# Patient Record
Sex: MALE | Race: BLACK OR AFRICAN AMERICAN | Employment: FULL TIME | URBAN - METROPOLITAN AREA
[De-identification: names, ages, dates, MRNs, and addresses within clinical notes are randomized per-mention and may not be internally consistent; named-entity substitution may affect disease eponyms.]

---

## 2019-05-20 ENCOUNTER — OFFICE VISIT (OUTPATIENT)
Dept: FAMILY MEDICINE CLINIC | Age: 44
End: 2019-05-20

## 2019-05-20 ENCOUNTER — HOSPITAL ENCOUNTER (OUTPATIENT)
Dept: GENERAL RADIOLOGY | Age: 44
Discharge: HOME | End: 2019-05-20
Payer: OTHER GOVERNMENT

## 2019-05-20 VITALS
DIASTOLIC BLOOD PRESSURE: 76 MMHG | WEIGHT: 229 LBS | RESPIRATION RATE: 16 BRPM | BODY MASS INDEX: 32.06 KG/M2 | HEIGHT: 71 IN | SYSTOLIC BLOOD PRESSURE: 112 MMHG | HEART RATE: 65 BPM | TEMPERATURE: 98.1 F | OXYGEN SATURATION: 96 %

## 2019-05-20 DIAGNOSIS — M53.3 TAIL BONE PAIN: ICD-10-CM

## 2019-05-20 DIAGNOSIS — Z00.00 ROUTINE MEDICAL EXAM: Primary | ICD-10-CM

## 2019-05-20 PROCEDURE — 72220 X-RAY EXAM SACRUM TAILBONE: CPT

## 2019-05-20 NOTE — PROGRESS NOTES
Subjective: Yadi Willams is a 40 y.o. male presenting for his annual checkup. No specific concern  Sexually active with one partner. Not using any protection. No h/o STD. discussed high BMI. Discussed diet modification, calorie count and exercise. Discussed importance of weight loss. agree to do exercise and life style modification. Diet and exercise hand out given in AVS.   Has tail bone pain on and off. Since last 3 years. Leana Snipe it occurs with prolong sitting. 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 Fell 6 months ago and that was hurting his lower back. No tenderness during today's exam.  No lower ext tingling or numbness or weakness. No trouble ambulation. No radiation of pain. No other concern. ROS:  Feeling well. No dyspnea or chest pain on exertion. No abdominal pain, change in bowel habits, black or bloody stools. No urinary tract or prostatic symptoms. No neurological complaints. There are no active problems to display for this patient. No Known Allergies  No past medical history on file. No past surgical history on file. No family history on file. Social History     Tobacco Use    Smoking status: Never Smoker    Smokeless tobacco: Never Used   Substance Use Topics    Alcohol use: Yes     Frequency: Monthly or less     Drinks per session: 1 or 2     Binge frequency: Never     Comment: rare/social          Objective:     Visit Vitals  /76 (BP 1 Location: Left arm, BP Patient Position: Sitting)   Pulse 65   Temp 98.1 °F (36.7 °C) (Oral)   Resp 16   Ht 5' 11\" (1.803 m)   Wt 229 lb (103.9 kg)   SpO2 96%   BMI 31.94 kg/m²     The patient appears well, alert, oriented x 3, in no distress. ENT normal.  Neck supple. No adenopathy or thyromegaly. WILLIAM. Lungs are clear, good air entry, no wheezes, rhonchi or rales. S1 and S2 normal, no murmurs, regular rate and rhythm. Abdomen is soft without tenderness, guarding, mass or organomegaly.   exam: no penile lesions or discharge, no testicular masses or tenderness, no hernias. Extremities show no edema, normal peripheral pulses. Neurological is normal without focal findings. No point tenderness over tail bone or point tenderness over lumbosacral spine   ROM wnl. No paraspinal muscle tenderness     Assessment/Plan:       ICD-10-CM ICD-9-CM    1. Routine medical exam Z00.00 V70.0    2. Tail bone pain: no point tenderness. On and off. Obtain x-ray as it is going on since last few years. Sending for PT as well. Heating pad. Motrin or tylenol as needed with food for pain. M53.3 724.79 REFERRAL TO PHYSICAL THERAPY      XR SACRUM AND COCCYX   Pt understood and agree with the plan   Review hM   Follow-up and Dispositions    · Return in about 2 months (around 7/20/2019).      Bam Ware

## 2019-05-20 NOTE — PATIENT INSTRUCTIONS
Well Visit, Ages 25 to 48: Care Instructions  Your Care Instructions    Physical exams can help you stay healthy. Your doctor has checked your overall health and may have suggested ways to take good care of yourself. He or she also may have recommended tests. At home, you can help prevent illness with healthy eating, regular exercise, and other steps. Follow-up care is a key part of your treatment and safety. Be sure to make and go to all appointments, and call your doctor if you are having problems. It's also a good idea to know your test results and keep a list of the medicines you take. How can you care for yourself at home? · Reach and stay at a healthy weight. This will lower your risk for many problems, such as obesity, diabetes, heart disease, and high blood pressure. · Get at least 30 minutes of physical activity on most days of the week. Walking is a good choice. You also may want to do other activities, such as running, swimming, cycling, or playing tennis or team sports. Discuss any changes in your exercise program with your doctor. · Do not smoke or allow others to smoke around you. If you need help quitting, talk to your doctor about stop-smoking programs and medicines. These can increase your chances of quitting for good. · Talk to your doctor about whether you have any risk factors for sexually transmitted infections (STIs). Having one sex partner (who does not have STIs and does not have sex with anyone else) is a good way to avoid these infections. · Use birth control if you do not want to have children at this time. Talk with your doctor about the choices available and what might be best for you. · Protect your skin from too much sun. When you're outdoors from 10 a.m. to 4 p.m., stay in the shade or cover up with clothing and a hat with a wide brim. Wear sunglasses that block UV rays. Even when it's cloudy, put broad-spectrum sunscreen (SPF 30 or higher) on any exposed skin.   · See a dentist one or two times a year for checkups and to have your teeth cleaned. · Wear a seat belt in the car. · Drink alcohol in moderation, if at all. That means no more than 2 drinks a day for men and 1 drink a day for women. Follow your doctor's advice about when to have certain tests. These tests can spot problems early. For everyone  · Cholesterol. Have the fat (cholesterol) in your blood tested after age 21. Your doctor will tell you how often to have this done based on your age, family history, or other things that can increase your risk for heart disease. · Blood pressure. Have your blood pressure checked during a routine doctor visit. Your doctor will tell you how often to check your blood pressure based on your age, your blood pressure results, and other factors. · Vision. Talk with your doctor about how often to have a glaucoma test.  · Diabetes. Ask your doctor whether you should have tests for diabetes. · Colon cancer. Have a test for colon cancer at age 48. You may have one of several tests. If you are younger than 48, you may need a test earlier if you have any risk factors. Risk factors include whether you already had a precancerous polyp removed from your colon or whether your parent, brother, sister, or child has had colon cancer. For women  · Breast exam and mammogram. Talk to your doctor about when you should have a clinical breast exam and a mammogram. Medical experts differ on whether and how often women under 50 should have these tests. Your doctor can help you decide what is right for you. · Pap test and pelvic exam. Begin Pap tests at age 24. A Pap test is the best way to find cervical cancer. The test often is part of a pelvic exam. Ask how often to have this test.  · Tests for sexually transmitted infections (STIs). Ask whether you should have tests for STIs. You may be at risk if you have sex with more than one person, especially if your partners do not wear condoms.   For men  · Tests for sexually transmitted infections (STIs). Ask whether you should have tests for STIs. You may be at risk if you have sex with more than one person, especially if you do not wear a condom. · Testicular cancer exam. Ask your doctor whether you should check your testicles regularly. · Prostate exam. Talk to your doctor about whether you should have a blood test (called a PSA test) for prostate cancer. Experts differ on whether and when men should have this test. Some experts suggest it if you are older than 39 and are -American or have a father or brother who got prostate cancer when he was younger than 72. When should you call for help? Watch closely for changes in your health, and be sure to contact your doctor if you have any problems or symptoms that concern you. Where can you learn more? Go to http://amparo-alissa.info/. Enter P072 in the search box to learn more about \"Well Visit, Ages 25 to 48: Care Instructions. \"  Current as of: March 28, 2018  Content Version: 11.9  © 5669-7202 [employer]. Care instructions adapted under license by [employer] (which disclaims liability or warranty for this information). If you have questions about a medical condition or this instruction, always ask your healthcare professional. [place] [address] any warranty or liability for your use of this information. Eating Healthy Foods: Care Instructions  Your Care Instructions    Eating healthy foods can help lower your risk for disease. Healthy food gives you energy and keeps your heart strong, your brain active, your muscles working, and your bones strong. A healthy diet includes a variety of foods from the basic food groups: grains, vegetables, fruits, milk and milk products, and meat and beans. Some people may eat more of their favorite foods from only one food group and, as a result, miss getting the nutrients they need.  So, it is important to pay attention not only to what you eat but also to what you are missing from your diet. You can eat a healthy, balanced diet by making a few small changes. Follow-up care is a key part of your treatment and safety. Be sure to make and go to all appointments, and call your doctor if you are having problems. It's also a good idea to know your test results and keep a list of the medicines you take. How can you care for yourself at home? Look at what you eat  · Keep a food diary for a week or two and record everything you eat or drink. Track the number of servings you eat from each food group. · For a balanced diet every day, eat a variety of:  ? 6 or more ounce-equivalents of grains, such as cereals, breads, crackers, rice, or pasta, every day. An ounce-equivalent is 1 slice of bread, 1 cup of ready-to-eat cereal, or ½ cup of cooked rice, cooked pasta, or cooked cereal.  ? 2½ cups of vegetables, especially:  § Dark-green vegetables such as broccoli and spinach. § Orange vegetables such as carrots and sweet potatoes. § Dry beans (such as cardona and kidney beans) and peas (such as lentils). ? 2 cups of fresh, frozen, or canned fruit. A small apple or 1 banana or orange equals 1 cup. ? 3 cups of nonfat or low-fat milk, yogurt, or other milk products. ? 5½ ounces of meat and beans, such as chicken, fish, lean meat, beans, nuts, and seeds. One egg, 1 tablespoon of peanut butter, ½ ounce nuts or seeds, or ¼ cup of cooked beans equals 1 ounce of meat. · Learn how to read food labels for serving sizes and ingredients. Fast-food and convenience-food meals often contain few or no fruits or vegetables. Make sure you eat some fruits and vegetables to make the meal more nutritious. · Look at your food diary. For each food group, add up what you have eaten and then divide the total by the number of days. This will give you an idea of how much you are eating from each food group.  See if you can find some ways to change your diet to make it more healthy. Start small  · Do not try to make dramatic changes to your diet all at once. You might feel that you are missing out on your favorite foods and then be more likely to fail. · Start slowly, and gradually change your habits. Try some of the following:  ? Use whole wheat bread instead of white bread. ? Use nonfat or low-fat milk instead of whole milk. ? Eat brown rice instead of white rice, and eat whole wheat pasta instead of white-flour pasta. ? Try low-fat cheeses and low-fat yogurt. ? Add more fruits and vegetables to meals and have them for snacks. ? Add lettuce, tomato, cucumber, and onion to sandwiches. ? Add fruit to yogurt and cereal.  Enjoy food  · You can still eat your favorite foods. You just may need to eat less of them. If your favorite foods are high in fat, salt, and sugar, limit how often you eat them, but do not cut them out entirely. · Eat a wide variety of foods. Make healthy choices when eating out  · The type of restaurant you choose can help you make healthy choices. Even fast-food chains are now offering more low-fat or healthier choices on the menu. · Choose smaller portions, or take half of your meal home. · When eating out, try:  ? A veggie pizza with a whole wheat crust or grilled chicken (instead of sausage or pepperoni). ? Pasta with roasted vegetables, grilled chicken, or marinara sauce instead of cream sauce. ? A vegetable wrap or grilled chicken wrap. ? Broiled or poached food instead of fried or breaded items. Make healthy choices easy  · Buy packaged, prewashed, ready-to-eat fresh vegetables and fruits, such as baby carrots, salad mixes, and chopped or shredded broccoli and cauliflower. · Buy packaged, presliced fruits, such as melon or pineapple. · Choose 100% fruit or vegetable juice instead of soda. Limit juice intake to 4 to 6 oz (½ to ¾ cup) a day.   · Blend low-fat yogurt, fruit juice, and canned or frozen fruit to make a smoothie for breakfast or a snack. Where can you learn more? Go to http://amparo-alissa.info/. Enter T756 in the search box to learn more about \"Eating Healthy Foods: Care Instructions. \"  Current as of: March 28, 2018  Content Version: 11.9  © 7053-6578 [employer]. Care instructions adapted under license by [employer] (which disclaims liability or warranty for this information). If you have questions about a medical condition or this instruction, always ask your healthcare professional. [address] any warranty or liability for your use of this information. Coccyx Pain: Care Instructions  Your Care Instructions    The coccyx is your tailbone. You can have pain in your tailbone from a fall or other injury. Pregnancy and childbirth also can cause tailbone pain. Sometimes, the cause of pain is not known. A tailbone injury causes pain when you sit, especially when you slump or sit on a hard seat. Straining to have a bowel movement also can be very painful. Tailbone injuries can take several months to heal, but in some cases the pain goes even longer. You can take steps at home to ease the pain. In some cases, a doctor injects a corticosteroid medicine into the coccyx to reduce swelling and pain. Follow-up care is a key part of your treatment and safety. Be sure to make and go to all appointments, and call your doctor if you are having problems. It's also a good idea to know your test results and keep a list of the medicines you take. How can you care for yourself at home? · Take pain medicines exactly as directed. ? If the doctor gave you a prescription medicine for pain, take it as prescribed. ? If you are not taking a prescription pain medicine, take an over-the-counter medicine to reduce pain. · Put ice or a cold pack on your tailbone for 10 to 20 minutes at a time.  Try to do this every 1 to 2 hours for the next 3 days (when you are awake) or until the swelling goes down. Put a thin cloth between the ice and your skin. · About 2 or 3 days after your injury, you can alternate ice and heat. To soothe the tailbone area, take a warm bath for 20 minutes, 3 or 4 times a day. · Sit on soft, padded surfaces. A doughnut-shaped pillow can take pressure off the tailbone. · Avoid constipation, because straining to have a bowel movement will increase your tailbone pain. ? Include fruits, vegetables, beans, and whole grains in your diet each day. These foods are high in fiber. ? Drink plenty of fluids, enough so that your urine is light yellow or clear like water. If you have kidney, heart, or liver disease and have to limit fluids, talk with your doctor before you increase the amount of fluids you drink. ? Get some exercise every day. Build up slowly to 30 to 60 minutes a day on 5 or more days of the week. ? Take a fiber supplement, such as Citrucel or Metamucil, every day if needed. Read and follow all instructions on the label. ? Schedule time each day for a bowel movement. A daily routine may help. Take your time and do not strain when having a bowel movement. · Follow your doctor's directions for stretching and other exercises that might help with pain. When should you call for help? Call 911 anytime you think you may need emergency care. For example, call if:    · You are unable to move a leg at all.   [facility] your doctor now or seek immediate medical care if:    · You have new or worse symptoms in your legs or buttocks. Symptoms may include:  ? Numbness or tingling. ? Weakness. ? Pain.     · You lose bladder or bowel control.    Watch closely for changes in your health, and be sure to contact your doctor if:    · You are not getting better as expected. Where can you learn more? Go to http://amparo-alissa.info/. Enter J094 in the search box to learn more about \"Coccyx Pain: Care Instructions. \"  Current as of: September 23, 2018  Content Version: 11.9  © 6143-5166 [employer]. Care instructions adapted under license by [employer] (which disclaims liability or warranty for this information). If you have questions about a medical condition or this instruction, always ask your healthcare professional. [address] any warranty or liability for your use of this information.

## 2019-05-20 NOTE — PROGRESS NOTES
1. Have you been to the ER, urgent care clinic since your last visit? Hospitalized since your last visit? No    2. Have you seen or consulted any other health care providers outside of the [address] [place] [address] since your last visit? Include any pap smears or colon screening.  No    Chief Complaint   Patient presents with    Complete Physical

## 2019-05-21 DIAGNOSIS — S32.2XXA CLOSED FRACTURE OF COCCYX, INITIAL ENCOUNTER (HCC): Primary | ICD-10-CM

## 2019-05-21 NOTE — PROGRESS NOTES
Contacted patient and verified identity using name and date of birth (2- identifiers)  Spoke with patient and he verbalized understanding of non displaced fracture of coccyx.  Ortho referral pushed through

## 2019-05-21 NOTE — PROGRESS NOTES
Non displaced fracture. For now hold off physical therapy referral and doing ortho referral. Thanks.  Please call pt

## 2019-05-28 ENCOUNTER — OFFICE VISIT (OUTPATIENT)
Dept: ORTHOPEDIC SURGERY | Age: 44
End: 2019-05-28

## 2019-05-28 ENCOUNTER — TELEPHONE (OUTPATIENT)
Dept: FAMILY MEDICINE CLINIC | Age: 44
End: 2019-05-28

## 2019-05-28 ENCOUNTER — TELEPHONE (OUTPATIENT)
Dept: ORTHOPEDIC SURGERY | Age: 44
End: 2019-05-28

## 2019-05-28 VITALS
TEMPERATURE: 98.3 F | OXYGEN SATURATION: 97 % | SYSTOLIC BLOOD PRESSURE: 117 MMHG | BODY MASS INDEX: 32.76 KG/M2 | RESPIRATION RATE: 18 BRPM | HEIGHT: 71 IN | WEIGHT: 234 LBS | DIASTOLIC BLOOD PRESSURE: 74 MMHG | HEART RATE: 64 BPM

## 2019-05-28 DIAGNOSIS — M62.838 MUSCLE SPASM: ICD-10-CM

## 2019-05-28 DIAGNOSIS — M47.816 LUMBAR FACET ARTHROPATHY: Primary | ICD-10-CM

## 2019-05-28 DIAGNOSIS — S34.3XXD: ICD-10-CM

## 2019-05-28 DIAGNOSIS — M54.50 LUMBAR PAIN: ICD-10-CM

## 2019-05-28 DIAGNOSIS — S32.2XXD: ICD-10-CM

## 2019-05-28 NOTE — TELEPHONE ENCOUNTER
Returned call to patient, verified Name/, informed patient, per Dr. Stella Best, she reviewed previous xrays. Per Dr. Stella Best, no xrays were ordered today, however if xrays are needed, they will be obtained at next follow up appt. Patient verbalized agreement/understanding. No further action required at this time.

## 2019-05-28 NOTE — PATIENT INSTRUCTIONS
Low Back Arthritis: Exercises  Your Care Instructions  Here are some examples of typical rehabilitation exercises for your condition. Start each exercise slowly. Ease off the exercise if you start to have pain. Your doctor or physical therapist will tell you when you can start these exercises and which ones will work best for you. When you are not being active, find a comfortable position for rest. Some people are comfortable on the floor or a medium-firm bed with a small pillow under their head and another under their knees. Some people prefer to lie on their side with a pillow between their knees. Don't stay in one position for too long. Take short walks (10 to 20 minutes) every 2 to 3 hours. Avoid slopes, hills, and stairs until you feel better. Walk only distances you can manage without pain, especially leg pain. How to do the exercises  Pelvic tilt    1. Lie on your back with your knees bent. 2. \"Brace\" your stomach--tighten your muscles by pulling in and imagining your belly button moving toward your spine. 3. Press your lower back into the floor. You should feel your hips and pelvis rock back. 4. Hold for 6 seconds while breathing smoothly. 5. Relax and allow your pelvis and hips to rock forward. 6. Repeat 8 to 12 times. Back stretches    1. Get down on your hands and knees on the floor. 2. Relax your head and allow it to droop. Round your back up toward the ceiling until you feel a nice stretch in your upper, middle, and lower back. Hold this stretch for as long as it feels comfortable, or about 15 to 30 seconds. 3. Return to the starting position with a flat back while you are on your hands and knees. 4. Let your back sway by pressing your stomach toward the floor. Lift your buttocks toward the ceiling. 5. Hold this position for 15 to 30 seconds. 6. Repeat 2 to 4 times. Follow-up care is a key part of your treatment and safety.  Be sure to make and go to all appointments, and call your doctor if you are having problems. It's also a good idea to know your test results and keep a list of the medicines you take. Where can you learn more? Go to http://amparo-alissa.info/. Enter K009 in the search box to learn more about \"Low Back Arthritis: Exercises. \"  Current as of: September 20, 2018  Content Version: 11.9  © 0804-5616 [employer] Care instructions adapted under license by [employer] (which disclaims liability or warranty for this information). If you have questions about a medical condition or this instruction, always ask your healthcare professional. [address] any warranty or liability for your use of this information.

## 2019-05-28 NOTE — LETTER
5/29/19 Patient: Aura Ramirez YOB: 1975 Date of Visit: 5/28/2019 Maximiliano Pitts MD 
[place] [address] VIA In Basket Dear Maximiliano Pitts MD, Thank you for referring Mr. Luz De Jesus to [facility] for evaluation. My notes for this consultation are attached. If you have questions, please do not hesitate to call me. I look forward to following your patient along with you. Sincerely, Jody Sesay MD

## 2019-05-28 NOTE — PROGRESS NOTES
[facility]  [address] [place], [address]  Phone: [phone]  Fax: [phone]    NEW PATIENT  Pt's YOB: 1975    ASSESSMENT   Diagnoses and all orders for this visit:    1. Lumbar facet arthropathy  -     REFERRAL TO PHYSICAL THERAPY    2. Muscle spasm  -     REFERRAL TO PHYSICAL THERAPY    3. Closed fracture of coccyx with complete cauda equina lesion with routine healing, subsequent encounter    4. Lumbar pain  -     REFERRAL TO PHYSICAL THERAPY         IMPRESSION AND PLAN:  Odalis Murphy is a 40 y.o. male with history of low back. Pt fell a couple months ago when he was walking his dog down some stairs and notes that a few weeks later, he started to experience aching pain when he was sitting. He admits to intermittent pain in the neck but denies any numbness, tingling, or pain in the arms at this time. 1) Pt was given information on lumbar arthritis pain exercises. 2) He [facility]. 3) I recommended he use a coccyx cushion or lumbar seat support for his truck. 4) I recommended the patient try an inversion table. 5) Pt was counseled on proper lifting mechanics. 6) He may use ibuprofen 200 mg 2-3 tabs BID or Aleve 220 mg 1-2 tabs BID with food if needed. 7) I recommended the Pt try water exercise, yoga, and core strengthening exercises. 8) Mr. Nita Royal has a reminder for a \"due or due soon\" health maintenance. I have asked that he contact his primary care provider, Ariela Baires MD, for follow-up on this health maintenance. 9)  demonstrated consistency with prescribing. 10) Pt will follow-up in 4-6 weeks or sooner if needed.   11) Pt requested a note to be out of work until next Monday Clarisse 3, 2019-- he was given a note stating he was seen today, was to begin PT this week on May 30 and had requested a note to be out of work until Clarisse 3.    12) Recommend lumbar x-rays -- these were not obtained during the office visit due to technical difficulties with the x-ray equipment-- this was later repaired and the patient was offered an opportunity to return for these; he states he will return on 05/30/19 after he attends PT for the x-rays (orders will be placed when he comes in). HISTORY OF PRESENT ILLNESS:  Katherin Mauricio is a 40 y.o. male with history of low back. Pt presents to the office today as a new patient referred by Lilliana Beltran MD. Pt notes that he fell a couple months ago when he was walking his dog down some stairs. He states that he fell backwards and landed on his buttocks but denies any pain after the fall. Pt notes that a few weeks later, he started to experience aching pain in his lower back when he was sitting. Pt notes that his pain never improved and gradually worsened. He denies any pain radiating down the legs at this time. Pt notes that he has to lay on his side in a fetal position when sleeping and reports pain when laying supine or prone. He admits to intermittent pain in the neck but denies any numbness, tingling, or pain in the arms at this time. His symptoms are worse when changing positions and when standing. Of note, he drives a diesel truck and is unsure if his symptoms could be due to bouncing up and down frequently when driving. He denies any previous lumbar surgery and has not taken Tylenol or antiinflammatories. Pt at this time desires to proceed with aqua physical therapy. Pain Scale: /10     PCP: Lilliana Beltran MD    History reviewed. No pertinent past medical history.      Social History     Socioeconomic History    Marital status:      Spouse name: Not on file    Number of children: Not on file    Years of education: Not on file    Highest education level: Not on file   Occupational History    Not on file   Social Needs    Financial resource strain: Not on file    Food insecurity:     Worry: Not on file     Inability: Not on file   Rodriguez Transportation needs:     Medical: Not on file     Non-medical: Not on file   Tobacco Use    Smoking status: Never Smoker    Smokeless tobacco: Never Used   Substance and Sexual Activity    Alcohol use: Yes     Frequency: Monthly or less     Drinks per session: 1 or 2     Binge frequency: Never     Comment: rare/social    Drug use: No    Sexual activity: Yes     Partners: Female   Lifestyle    Physical activity:     Days per week: Not on file     Minutes per session: Not on file    Stress: Not on file   Relationships    Social connections:     Talks on phone: Not on file     Gets together: Not on file     Attends [employer] service: Not on file     Active member of club or organization: Not on file     Attends meetings of clubs or organizations: Not on file     Relationship status: Not on file    Intimate partner violence:     Fear of current or ex partner: Not on file     Emotionally abused: Not on file     Physically abused: Not on file     Forced sexual activity: Not on file   Other Topics Concern     Service Not Asked    Blood Transfusions Not Asked    Caffeine Concern Not Asked    Occupational Exposure Not Asked   Larna Danyell Hazards Not Asked    Sleep Concern Not Asked    Stress Concern Not Asked    Weight Concern Not Asked    Special Diet Not Asked    Back Care Not Asked    Exercise Not Asked    Bike Helmet Not Asked   [address] Not Asked    Self-Exams Not Asked   Social History Narrative    Not on file           No Known Allergies    REVIEW OF SYSTEMS    Constitutional: Negative for fever, chills, or weight change. Respiratory: Negative for cough or shortness of breath. Cardiovascular: Negative for chest pain or palpitations. Gastrointestinal: Negative for acid reflux, change in bowel habits, or constipation. Genitourinary: Negative for dysuria and flank pain. Musculoskeletal: Positive for lumbar pain. Skin: Negative for rash.    Neurological: Negative for headaches, dizziness, or numbness. Endo/Heme/Allergies: Negative for increased bruising. Psychiatric/Behavioral: Negative for difficulty with sleep. PHYSICAL EXAMINATION  Visit Vitals  /74   Pulse 64   Temp 98.3 °F (36.8 °C) (Oral)   Resp 18   Ht 5' 11\" (1.803 m)   Wt 234 lb (106.1 kg)   SpO2 97%   BMI 32.64 kg/m²       Constitutional: Awake, alert, and in no acute distress. HEENT: Normocephalic. Atraumatic. Oropharynx is moist and clear. PERRL. EOMI. Sclerae are nonicteric  Heart: Regular rate and rhythm  Lungs: Clear to auscultation bilaterally  Abdomen: Soft and nontender. Bowel sounds are present  Neurological: 1+ symmetrical DTRs in the upper extremities. 1+ symmetrical DTRs in the lower extremities. Sensation to light touch is intact. Negative Beau's sign bilaterally. Skin: warm, dry, and intact. Musculoskeletal: Good range of motion in the cervical spine on all planes. Tight across the upper trapezius bilaterally. Moderate pain with extension and axial loading. No pain with internal or external rotation of his hips. Negative straight leg raise bilaterally. No pain with heel or toe walking. No difficulty with the single leg stand bilaterally. Biceps  Triceps Deltoids Wrist Ext Wrist Flex Hand Intrin   Right +4/5 +4/5 +4/5 +4/5 +4/5 +4/5   Left +4/5 +4/5 +4/5 +4/5 +4/5 +4/5      Hip Flex  Quads Hamstrings Ankle DF EHL Ankle PF   Right +4/5 +4/5 +4/5 +4/5 +4/5 +4/5   Left +4/5 +4/5 +4/5 +4/5 +4/5 +4/5     IMAGING:    Sacrum and coccyx x-rays from 05/20/2019 were personally reviewed with the patient and demonstrated:  Results from Hospital Encounter encounter on 05/20/19   XR SACRUM AND COCCYX    Narrative Examination: Coccyx and sacrum, 3 views     History: Coccygeal pain    Comparison: None    Findings: Alignment is anatomic. There is a nondisplaced fracture through the  coccyx. No other definite acute fracture is seen. Overlying bowel gas is  nonobstructive. Impression Impression:  1. Possible nondisplaced fracture through the coccyx. Written by Carmenza Elder, as dictated by Artie Guerra MD.  I, Dr. Artie Guerra confirm that all documentation is accurate.

## 2019-05-28 NOTE — TELEPHONE ENCOUNTER
PT STATES HE CAME FOR APPT BUT WAS UNABLE TO HAVE XRAY AS MACHINE WAS DOWN.  PLEASE CALL TO ADVISE IF PT IS SUPPOSED TO HAVE XRAY - HE STATES HE PAID FOR VISIT AND IT SHOULD HAVE BEEN INCLUDED IN VISIT - STATES HE HAS FX AND DOCTOR SHOULD HAVE BEEN ABLE TO SEE THAT WITH XRAY AT [facility]

## 2019-05-28 NOTE — PROGRESS NOTES
Abeba Mckeon presents today for   Chief Complaint   Patient presents with    Back Pain     lbp    New Patient     referred by Dr. Karl Lopez       Is someone accompanying this pt? no    Is the patient using any DME equipment during [address]? no    Depression Screening:  3 most recent PHQ Screens 5/20/2019   Little interest or pleasure in doing things Not at all   Feeling down, depressed, irritable, or hopeless Not at all   Total Score PHQ 2 0       Learning Assessment:  Learning Assessment 5/20/2019   PRIMARY LEARNER Patient   HIGHEST LEVEL OF EDUCATION - PRIMARY LEARNER  GRADUATED HIGH SCHOOL OR GED   BARRIERS PRIMARY LEARNER NONE   CO-LEARNER CAREGIVER No   PRIMARY LANGUAGE ENGLISH    NEED No   LEARNER PREFERENCE PRIMARY VIDEOS     -   LEARNING SPECIAL TOPICS No   ANSWERED BY PATIENT   RELATIONSHIP SELF       Abuse Screening:  Abuse Screening Questionnaire 5/20/2019   Do you ever feel afraid of your partner? N   Are you in a relationship with someone who physically or mentally threatens you? N   Is it safe for you to go home? Y       OPIOID RISK TOOL  No flowsheet data found. Pt currently taking Antiplatelet therapy? no    Coordination of Care:  1. Have you been to the ER, urgent care clinic since your last visit? no  Hospitalized since your last visit? no    2. Have you seen or consulted any other health care providers outside of the [address] [place] [address] since your last visit? no Include any pap smears or colon screening.  no      Last  Checked 05/58/19

## 2019-05-28 NOTE — LETTER
NOTIFICATION RETURN TO WORK / SCHOOL 
 
5/28/2019 9:53 AM 
 
Mr. Zack Doll [address] [address] To Whom It May Concern: Zack Doll is currently under the care of [facility]. Mr. Yeyo Ball was seen in our office today, 05/28/19, for evaluation of his lumbar pain. Mr. Yeyo Ball will be starting physical therapy at our [facility] location on Thursday 05/30/19. Mr. Yeyo Ball requests to return to work on Monday 06/03/19. If there are questions or concerns please have the patient contact our office. Sincerely, Cheri Prince MD

## 2019-05-29 NOTE — TELEPHONE ENCOUNTER
Contacted patient and verified identity using name and date of birth (2- identifiers)  Spoke with patient and he was concerned that Dr. Halie Jarvis did not mention or see the fracture and only mentioned arthritis. I explained to the patient that when looking at the actual films the providers are able to see a larger area than the dedicated coccyx which we were looking at. Dr. Halie Jarvis did refer patient to PT and I advised patient to go ahead and start PT (has appt tomorrow 5/31/19) and follow-up with Dr. Halie Jarvis. He verbalized understanding.

## 2019-05-30 ENCOUNTER — HOSPITAL ENCOUNTER (OUTPATIENT)
Dept: PHYSICAL THERAPY | Age: 44
Discharge: HOME | End: 2019-05-30
Payer: OTHER GOVERNMENT

## 2019-05-30 ENCOUNTER — TELEPHONE (OUTPATIENT)
Dept: FAMILY MEDICINE CLINIC | Age: 44
End: 2019-05-30

## 2019-05-30 PROCEDURE — 97161 PT EVAL LOW COMPLEX 20 MIN: CPT

## 2019-05-30 PROCEDURE — 97530 THERAPEUTIC ACTIVITIES: CPT

## 2019-05-30 PROCEDURE — 97110 THERAPEUTIC EXERCISES: CPT

## 2019-05-30 NOTE — PROGRESS NOTES
[facility]  [address]  [place], [address]  [phone] fax    Plan of Care/ Statement of Necessity for Physical Therapy Services    Patient name: Danielle Martinez Start of Care: 2019   Referral source: Rose Forrester MD : 1975    Medical Diagnosis: Spondylosis without myelopathy or radiculopathy, lumbar region [M47.816]  Payor: Nesha Lopez / Plan: [employer] / Product Type: Commerical /  Onset Date: about 3 months    Treatment Diagnosis: LBP   Prior Hospitalization: see medical history Provider#: [id number]   Medications: Verified on Patient summary List    Comorbidities: none reported    Prior Level of Function: functionally I with all activities     The Plan of Care and following information is based on the information from the initial evaluation. Assessment/ key information: 41 y/o male presents with c/o LBP that started several months ago with no specific mechanism of injury. Lumbar AROM as follows: flexion 75% of full with pain, extension 75%, right SB 75% of full with pain, left SB WFL, B rotation WFL. + tenderness to palpation over L2-S1 and paraspinals. Limited flexibility noted quads and hip flexors. MMT B LEs with decreased glute max (4/5) and glute med (4+/5) strength. Decreased core stability noted as well. Pt will benefit from PT to address the aforementioned impairments. Evaluation Complexity History LOW Complexity : Zero comorbidities / personal factors that will impact the outcome / POC; Examination MEDIUM Complexity : 3 Standardized tests and measures addressing body structure, function, activity limitation and / or participation in recreation  ;Presentation MEDIUM Complexity : Evolving with changing characteristics  ; Clinical Decision Making MEDIUM Complexity : FOTO score of 26-74  Overall Complexity Rating: LOW   Problem List: pain affecting function, decrease ROM, decrease strength, decrease ADL/ functional abilitiies, decrease activity tolerance, decrease flexibility/ joint mobility and decrease transfer abilities   Treatment Plan may include any combination of the following: Therapeutic exercise, Therapeutic activities, Neuromuscular re-education, Physical agent/modality, Manual therapy, Aquatic therapy, Patient education and Self Care training  Patient / Family readiness to learn indicated by: asking questions, trying to perform skills and interest  Persons(s) to be included in education: patient (P)  Barriers to Learning/Limitations: None  Patient Goal (s): To help the pain to go away and strengthen my core  Patient Self Reported Health Status: good  Rehabilitation Potential: good    Short Term Goals: To be accomplished in 1 weeks:   1. Pt will be I and compliant with HEP   Long Term Goals: To be accomplished in 4 weeks:   1. Improve FOTO to 64 to improve ability for daily tasks   2. Pt will report pt will report no difficulty with putting on shoes/socks   3. Pt will report no difficulty with driving for 1 hour. 4. Improve B glute max MMT to at least 4+/5 to improve ability for daily tasks    Frequency / Duration: Patient to be seen 2 times per week for 4 weeks. Patient/ Caregiver education and instruction: Diagnosis, prognosis, self care, activity modification and exercises   [x]  Plan of care has been reviewed with JAZMINE Rodriguez, PT 5/30/2019 8:17 AM    ________________________________________________________________________    I certify that the above Therapy Services are being furnished while the patient is under my care. I agree with the treatment plan and certify that this therapy is necessary.     Physician's Signature:____________Date:_________TIME:________    ** Signature, Date and Time must be completed for valid certification **    Please sign and return to [facility] [address] [place] [address] & [facility]  [address]  [place], [address]  [phone] fax

## 2019-05-30 NOTE — PROGRESS NOTES
PT DAILY TREATMENT NOTE     Patient Name: Mandy Phoenix  Date:2019  : 1975  [x]  Patient  Verified  Payor: Brittney Market / Plan: [employer] / Product Type: Commerical /    In time:730  Out time:0810  Total Treatment Time (min): 40  Visit #: 1 of 8    Treatment Area: Spondylosis without myelopathy or radiculopathy, lumbar region [M47.816]    SUBJECTIVE  Pain Level (0-10 scale): 8  Any medication changes, allergies to medications, adverse drug reactions, diagnosis change, or new procedure performed?: [x] No    [] Yes (see summary sheet for update)  Subjective functional status/changes:   [] No changes reported       OBJECTIVE    Modality rationale:    Min Type Additional Details    [] Estim:  []Unatt       []IFC  []Premod                        []Other:  []w/ice   []w/heat  Position:  Location:    [] Estim: []Att    []TENS instruct  []NMES                    []Other:  []w/US   []w/ice   []w/heat  Position:  Location:    []  Traction: [] Cervical       []Lumbar                       [] Prone          []Supine                       []Intermittent   []Continuous Lbs:  [] before manual  [] after manual    []  Ultrasound: []Continuous   [] Pulsed                           []1MHz   []3MHz W/cm2:  Location:    []  Iontophoresis with dexamethasone         Location: [] Take home patch   [] In clinic    []  Ice     []  heat  []  Ice massage  []  Laser   []  Anodyne Position:  Location:    []  Laser with stim  []  Other:  Position:  Location:    []  Vasopneumatic Device Pressure:       [] lo [] med [] hi   Temperature: [] lo [] med [] hi   [] Skin assessment post-treatment:  []intact []redness- no adverse reaction    []redness - adverse reaction:     15 min [x]Eval                  []Re-Eval       15 min Therapeutic Exercise:  [] See flow sheet : HEP   Rationale: increase ROM and increase strength to improve the patients ability to perform functional tasks    10 min Therapeutic Activity:  []  See flow sheet : body mechanics, posture   Rationale: decrease lumbar strain  to improve the patients ability to perform daily tasks. With   [] TE   [] TA   [] neuro   [] other: Patient Education: [x] Review HEP    [] Progressed/Changed HEP based on:   [] positioning   [] body mechanics   [] transfers   [] heat/ice application    [] other:      Other Objective/Functional Measures:       Pain Level (0-10 scale) post treatment: 8    ASSESSMENT/Changes in Function:      Patient will continue to benefit from skilled PT services to modify and progress therapeutic interventions, address functional mobility deficits, address ROM deficits, address strength deficits, analyze and address soft tissue restrictions, analyze and cue movement patterns, analyze and modify body mechanics/ergonomics and assess and modify postural abnormalities to attain remaining goals.      [x]  See Plan of Care   []  See progress note/recertification  []  See Discharge Summary         Progress towards goals / Updated goals:  Per POC    PLAN  []  Upgrade activities as tolerated     []  Continue plan of care  []  Update interventions per flow sheet       []  Discharge due to:_  []  Other:_      Krishna Odonnell, PT 5/30/2019  8:14 AM    Future Appointments   Date Time Provider Roger Gomez   7/9/2019  8:15 AM Leslie Potter  [address]

## 2019-05-30 NOTE — TELEPHONE ENCOUNTER
Pt states that he received a message that he was to get another Xray today and was not sure about it because there wasn't an order in the system. Could you please check in to this and advise.

## 2019-06-04 NOTE — TELEPHONE ENCOUNTER
Contacted patient and verified identity using name and date of birth (2- identifiers)  Spoke with patient and he is waiting on insurance approval for PT and follow-up with Dr. Naveen Molina

## 2019-06-10 ENCOUNTER — HOSPITAL ENCOUNTER (OUTPATIENT)
Dept: PHYSICAL THERAPY | Age: 44
Discharge: HOME | End: 2019-06-10
Payer: COMMERCIAL

## 2019-06-10 PROCEDURE — 97113 AQUATIC THERAPY/EXERCISES: CPT
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
## 2019-06-12 ENCOUNTER — HOSPITAL ENCOUNTER (OUTPATIENT)
Dept: PHYSICAL THERAPY | Age: 44
Discharge: HOME | End: 2019-06-12
Payer: COMMERCIAL

## 2019-06-12 PROCEDURE — 97113 AQUATIC THERAPY/EXERCISES: CPT

## 2019-06-12 NOTE — PROGRESS NOTES
PT DAILY TREATMENT NOTE 10-18    Patient Name: Paty Ham  Date:2019  : 1975  [x]  Patient  Verified  Payor: Nora Hall / Plan: Benji Moore / Product Type: Commerical /    In time:10:30  Out time:11:00  Total Treatment Time (min): 30  Visit #: 3 of 8    Treatment Area: Spondylosis without myelopathy or radiculopathy, lumbar region [M47.816]    SUBJECTIVE  Pain Level (0-10 scale): 5  Any medication changes, allergies to medications, adverse drug reactions, diagnosis change, or new procedure performed?: [x] No    [] Yes (see summary sheet for update)  Subjective functional status/changes:   [] No changes reported  Pt reports feeling sore today    OBJECTIVE    30 min Therapeutic Exercise:  [x] See flow sheet : aquatic therapy   Rationale: increase ROM and increase strength to improve the patients ability to perform ADLs            With   [] TE   [] TA   [] neuro   [] other: Patient Education: [x] Review HEP    [] Progressed/Changed HEP based on:   [] positioning   [] body mechanics   [] transfers   [] heat/ice application    [] other:      Other Objective/Functional Measures:   Paddles closed for UE exercises     Pain Level (0-10 scale) post treatment: 5    ASSESSMENT/Changes in Function: Pt performed well with therex today, able to perform all prescribed exercises without incr'd pain. Min postural cues. Pt states LBP is worse when laying on stomach or flat on back. Patient will continue to benefit from skilled PT services to modify and progress therapeutic interventions, address functional mobility deficits, address ROM deficits, address strength deficits, analyze and address soft tissue restrictions, analyze and cue movement patterns and analyze and modify body mechanics/ergonomics to attain remaining goals.      []  See Plan of Care  []  See progress note/recertification  []  See Discharge Summary         Progress towards goals / Updated goals:  Short Term Goals: To be accomplished in 1 weeks:               1. Pt will be I and compliant with HEP met per patient report (6/10/2019)  Long Term Goals: To be accomplished in 4 weeks:               1.  Improve FOTO to 64 to improve ability for daily tasks               2. Pt will report pt will report no difficulty with putting on shoes/socks               3. Pt will report no difficulty with driving for 1 hour.                4. Improve B glute max MMT to at least 4+/5 to improve ability for daily tasks        PLAN  []  Upgrade activities as tolerated     [x]  Continue plan of care  []  Update interventions per flow sheet       []  Discharge due to:_  []  Other:_      Shu Bach, JAZMINE 6/12/2019  10:32 AM    Future Appointments   Date Time Provider Roger Gomez   6/17/2019  9:00 AM Heide Temple, PT [facility]   6/19/2019  9:00 AM Heide Temple, PT [facility]   6/24/2019  9:00 AM Heide Temple PT [facility]   7/9/2019  8:15 AM Ara Delgado  [address]

## 2019-06-17 ENCOUNTER — APPOINTMENT (OUTPATIENT)
Dept: PHYSICAL THERAPY | Age: 44
End: 2019-06-17
Payer: COMMERCIAL

## 2019-06-18 ENCOUNTER — HOSPITAL ENCOUNTER (OUTPATIENT)
Dept: PHYSICAL THERAPY | Age: 44
Discharge: HOME | End: 2019-06-18
Payer: COMMERCIAL

## 2019-06-18 PROCEDURE — 97113 AQUATIC THERAPY/EXERCISES: CPT

## 2019-06-18 NOTE — PROGRESS NOTES
PT DAILY TREATMENT NOTE 12    Patient Name: Mandy Phoenix  Date:2019  : 1975  [x]  Patient  Verified  Payor: Brittney Market / Plan: [employer] / Product Type: Commerical /    In time:9:45am  Out time:10:32am  Total Treatment Time (min): 52  Visit #: 4 of 8    Treatment Area: Spondylosis without myelopathy or radiculopathy, lumbar region [M47.816]    SUBJECTIVE  Pain Level (0-10 scale): 10  Any medication changes, allergies to medications, adverse drug reactions, diagnosis change, or new procedure performed?: [x] No    [] Yes (see summary sheet for update)  Subjective functional status/changes:   [] No changes reported  Pt states that his back is feeling much better overall and that he feels that 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 He has not been performing this job for several weeks and feels that between eliminating this activity and the therapy, these have helped his back. Pt has a [employer] and states that he can continue with the water exercises on his own after he finishes with PT.     OBJECTIVE      47 min Therapeutic Exercise:  [x] See flow sheet : Aquatic therapy    Rationale: increase ROM and increase strength to improve the patients ability to perform ADLs and functional mobility           With   [] TE   [] TA   [] neuro   [] other: Patient Education: [x] Review HEP    [] Progressed/Changed HEP based on:   [] positioning   [] body mechanics   [] transfers   [] heat/ice application    [] other:      Other Objective/Functional Measures: Increased TM walking speed to 1. 3mph and added marching with alt UE flex and B HS stretch. Increased B hip flex/ext/abd to 15 reps each and squats to 2x10 reps. Pain Level (0-10 scale) post treatment: 3/10    ASSESSMENT/Changes in Function: Pt reporting improved functional ability (much less difficulty donning socks/shoes) and significant decreased pain level.      Patient will continue to benefit from skilled PT services to modify and progress therapeutic interventions, address functional mobility deficits, address ROM deficits, address strength deficits, analyze and cue movement patterns, analyze and modify body mechanics/ergonomics and assess and modify postural abnormalities to attain remaining goals. []  See Plan of Care  []  See progress note/recertification  []  See Discharge Summary         Progress towards goals / Updated goals:  Short Term Goals: To be accomplished in 1 weeks:               1. Pt will be I and compliant with HEP met per patient report (6/10/2019)  Long Term Goals: To be accomplished in 4 weeks:               1. Improve FOTO to 64 to improve ability for daily tasks               2. Pt will report pt will report no difficulty with putting on shoes/socks. Progressing: Pt reports having a little difficulty donning shoes and socks. (6/18/19).                  3. Pt will report no difficulty with driving for 1 hour.                4. Improve B glute max MMT to at least 4+/5 to improve ability for daily tasks           PLAN  [x]  Upgrade activities as tolerated     [x]  Continue plan of care  []  Update interventions per flow sheet       []  Discharge due to:_  []  Other:_      Hermilo Gonsalez, PT 6/18/2019  10:09 AM    Future Appointments   Date Time Provider Roger Gomez   6/19/2019  9:00 AM Irene Peraza PT [facility]   6/24/2019  9:00 AM Irene Peraza PT [facility]   7/9/2019  8:15 AM Karen Yeager  [address]

## 2019-06-19 ENCOUNTER — HOSPITAL ENCOUNTER (OUTPATIENT)
Dept: PHYSICAL THERAPY | Age: 44
Discharge: HOME | End: 2019-06-19
Payer: COMMERCIAL

## 2019-06-19 PROCEDURE — 97113 AQUATIC THERAPY/EXERCISES: CPT

## 2019-06-19 NOTE — PROGRESS NOTES
PT DAILY TREATMENT NOTE     Patient Name: Buzz Lechuga  Date:2019  : 1975  [x]  Patient  Verified  Payor: Nichole Mancera / Plan: Thiago Brown / Product Type: Commerical /    In time:9:00am  Out time:9:46am  Total Treatment Time (min): 55  Visit #: 5 of 8    Treatment Area: Spondylosis without myelopathy or radiculopathy, lumbar region [M47.816]    SUBJECTIVE  Pain Level (0-10 scale): 3/10  Any medication changes, allergies to medications, adverse drug reactions, diagnosis change, or new procedure performed?: [x] No    [] Yes (see summary sheet for update)  Subjective functional status/changes:   [] No changes reported  Pt reports feeling \"much better\". OBJECTIVE      46 min Therapeutic Exercise:  [x] See flow sheet : Aquatic therapy    Rationale: increase ROM and increase strength to improve the patients ability to perform ADLs and functional mobility tasks with improved ease and decreased pain. With   [] TE   [] TA   [] neuro   [] other: Patient Education: [x] Review HEP    [] Progressed/Changed HEP based on:   [] positioning   [] body mechanics   [] transfers   [] heat/ice application    [] other:      Other Objective/Functional Measures: Added 1.5lb bilaterally to all LE exercises. FOTO score of 72. Pain Level (0-10 scale) post treatment: 0/10    ASSESSMENT/Changes in Function: FOTO goal met. No back pain reported during or following treatment session. Patient will continue to benefit from skilled PT services to modify and progress therapeutic interventions, address functional mobility deficits, address ROM deficits, address strength deficits, analyze and cue movement patterns, analyze and modify body mechanics/ergonomics and assess and modify postural abnormalities to attain remaining goals. []  See Plan of Care  []  See progress note/recertification  []  See Discharge Summary         Progress towards goals / Updated goals:               6.  Pt will be I and compliant with HEP met per patient report (6/10/2019)  Long Term Goals: To be accomplished in 4 weeks:               1. Improve FOTO to 64 to improve ability for daily tasks. Goal met: FOTO score of 72. (6/19/19).              2. Pt will report pt will report no difficulty with putting on shoes/socks. Progressing: Pt reports having a little difficulty donning shoes and socks. (6/18/19).                  3. Pt will report no difficulty with driving for 1 hour.                4. Improve B glute max MMT to at least 4+/5 to improve ability for daily tasks         PLAN  []  Upgrade activities as tolerated     [x]  Continue plan of care  []  Update interventions per flow sheet       []  Discharge due to:_  []  Other:_      Coreen Boothe, PT 6/19/2019  9:04 AM    Future Appointments   Date Time Provider Roger Gomez   6/24/2019  9:00 AM Kalyani Norton, PT [facility]   7/9/2019  8:15 AM Reynaldo Shultz  [address]

## 2019-06-24 ENCOUNTER — APPOINTMENT (OUTPATIENT)
Dept: PHYSICAL THERAPY | Age: 44
End: 2019-06-24
Payer: COMMERCIAL

## 2019-08-15 NOTE — PROGRESS NOTES
[facility] [address]  [place], [address]  [phone] fax    Physical Therapy Discharge Summary  Patient name: Don Austin Start of Care: 2019   Referral source: Sai De Luna MD : 1975                Medical Diagnosis: Spondylosis without myelopathy or radiculopathy, lumbar region [M47.816]  Payor: Ophelia Saha / Plan: Ana Cruz / Product Type: Commerical /  Onset Date: about 3 months                Treatment Diagnosis: LBP   Prior Hospitalization: see medical history Provider#: [id number]   Medications: Verified on Patient summary List    Comorbidities: none reported      Prior Level of Function: functionally I with all activities    Visits from Start of Care: 5    Missed Visits: 3  Reporting Period : 19 to 19      Summary of Care:  Pt self discharged and we were unable to fully reassess. Progress towards goals / Updated goals:               1. Pt will be I and compliant with HEP met per patient report (6/10/2019)  Long Term Goals: To be accomplished in 4 weeks:               1. Improve FOTO to 64 to improve ability for daily tasks. Goal met: FOTO score of 72. (19).              2. Pt will report pt will report no difficulty with putting on shoes/socks. Progressing: Pt reports having a little difficulty donning shoes and socks. (19).                  3. Pt will report no difficulty with driving for 1 hour.                4. Improve B glute max MMT to at least 4+/5 to improve ability for daily tasks  ASSESSMENT/RECOMMENDATIONS:  [x]Discontinue therapy: []Patient has reached or is progressing toward set goals      [x]Patient is non-compliant or has abdicated      []Due to lack of appreciable progress towards set [address], PT 8/15/2019 12:50 PM

## 2019-12-03 ENCOUNTER — OFFICE VISIT (OUTPATIENT)
Dept: FAMILY MEDICINE CLINIC | Age: 44
End: 2019-12-03

## 2019-12-03 VITALS
HEIGHT: 71 IN | DIASTOLIC BLOOD PRESSURE: 84 MMHG | TEMPERATURE: 99 F | RESPIRATION RATE: 16 BRPM | BODY MASS INDEX: 33.26 KG/M2 | SYSTOLIC BLOOD PRESSURE: 120 MMHG | WEIGHT: 237.6 LBS | HEART RATE: 89 BPM | OXYGEN SATURATION: 96 %

## 2019-12-03 DIAGNOSIS — H65.92 LEFT NON-SUPPURATIVE OTITIS MEDIA: Primary | ICD-10-CM

## 2019-12-03 RX ORDER — AMOXICILLIN AND CLAVULANATE POTASSIUM 875; 125 MG/1; MG/1
1 TABLET, FILM COATED ORAL 2 TIMES DAILY
Qty: 14 TAB | Refills: 0 | Status: SHIPPED | OUTPATIENT
Start: 2019-12-03 | End: 2019-12-10

## 2019-12-03 RX ORDER — CIPROFLOXACIN AND DEXAMETHASONE 3; 1 MG/ML; MG/ML
4 SUSPENSION/ DROPS AURICULAR (OTIC) 2 TIMES DAILY
Qty: 7.5 ML | Refills: 0 | Status: SHIPPED | OUTPATIENT
Start: 2019-12-03 | End: 2019-12-03

## 2019-12-03 NOTE — PROGRESS NOTES
1. Have you been to the ER, urgent care clinic since your last visit? Hospitalized since your last visit? No    2. Have you seen or consulted any other health care providers outside of the [address] [place] [address] since your last visit? Include any pap smears or colon screening. No    Chief Complaint   Patient presents with   Major Hospital Follow Up     [facility] on 11/30/19       Patient refused flu vaccine. Patient states he is here for urgent care follow-up for left ear infection. His left ear still feels clogged.

## 2019-12-03 NOTE — PROGRESS NOTES
HISTORY OF PRESENT ILLNESS  Cally Marie is a 40 y.o. male. HPI:Here after urgent care visit. Had sudden onset of left ear discomfort. Patrecia Feast it was more pressure feeling and trouble hearing. Did not try to clean it. Was diagnosed with ear infection and was given amoxicillin. Has taken it since Saturday which is 3 days but no change in symptoms. No radiation of pain. No fever. No sore throat. No headache or dizziness, no chest pain or sob. No cough or cold. No nausea or vomiting. No abdominal pain. No fever. No urinary or bowel complains. 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 Not driving since symptoms started. No fall or balance concern. Visit Vitals  /84 (BP 1 Location: Left arm, BP Patient Position: Sitting)   Pulse 89   Temp 99 °F (37.2 °C) (Oral)   Resp 16   Ht 5' 11\" (1.803 m)   Wt 237 lb 9.6 oz (107.8 kg)   SpO2 96%   BMI 33.14 kg/m²     Review medication list, vitals, problem list,allergies. No urgent care report available. ROS: see HPI     Physical Exam  HENT:      Ears:      Comments: Left ear: generalize erythema in ear canal. No discharge noted. TM intact, light reflex present. Rt ear: Tm intact, normal light reflex. No erythema or tenderness. Lymphadenopathy:      Cervical: No cervical adenopathy. Neurological:      Mental Status: He is alert and oriented to person, place, and time. ASSESSMENT and PLAN    ICD-10-CM ICD-9-CM    1. Left non-suppurative otitis media: no improvement in 48 hours after taking amoxicillin. Changed it to Augmentin. If no improvement will consider ENT referral. For now advised to take tylenol or motrin with food for pain or fever. He was advised to hold driving till he improves symptomatically. Wife and him both understood and agree with the plan. Hold ear drops. Only oral Augmentin.   H65.92 381.4 amoxicillin-clavulanate (AUGMENTIN) 875-125 mg per tablet      DISCONTINUED: ciprofloxacin-dexamethasone (CIPRODEX) 0.3-0.1 % otic suspension   Pt understood and agree with the plan     Follow-up and Dispositions    · Return for need an appt for monday .

## 2019-12-03 NOTE — PATIENT INSTRUCTIONS

## 2019-12-06 ENCOUNTER — TELEPHONE (OUTPATIENT)
Dept: FAMILY MEDICINE CLINIC | Age: 44
End: 2019-12-06

## 2019-12-06 RX ORDER — CIPROFLOXACIN AND DEXAMETHASONE 3; 1 MG/ML; MG/ML
4 SUSPENSION/ DROPS AURICULAR (OTIC) 2 TIMES DAILY
Qty: 7.5 ML | Refills: 0 | Status: SHIPPED | OUTPATIENT
Start: 2019-12-06 | End: 2019-12-13

## 2019-12-06 NOTE — TELEPHONE ENCOUNTER
Contacted patient and verified identity using name and date of birth (2- identifiers)  Spoke with patient and he indicated that he is still having problems with his ears. Still reports no change in symptoms- still feels clogged. I advised patient I would send message to Dr. Cindi Collazo for recommendations.

## 2019-12-06 NOTE — TELEPHONE ENCOUNTER
Pt would like to discuss about ear drops the [facility]. Please call pt at your earliest convenience.

## 2019-12-09 ENCOUNTER — TELEPHONE (OUTPATIENT)
Dept: FAMILY MEDICINE CLINIC | Age: 44
End: 2019-12-09

## 2019-12-09 ENCOUNTER — OFFICE VISIT (OUTPATIENT)
Dept: FAMILY MEDICINE CLINIC | Age: 44
End: 2019-12-09

## 2019-12-09 VITALS
OXYGEN SATURATION: 95 % | HEART RATE: 63 BPM | HEIGHT: 71 IN | BODY MASS INDEX: 33.6 KG/M2 | TEMPERATURE: 98 F | DIASTOLIC BLOOD PRESSURE: 86 MMHG | SYSTOLIC BLOOD PRESSURE: 118 MMHG | RESPIRATION RATE: 16 BRPM | WEIGHT: 240 LBS

## 2019-12-09 DIAGNOSIS — H66.92 OTITIS MEDIA OF LEFT EAR FOLLOW-UP, NOT RESOLVED: Primary | ICD-10-CM

## 2019-12-09 DIAGNOSIS — H91.92 HEARING PROBLEM OF LEFT EAR: ICD-10-CM

## 2019-12-09 NOTE — TELEPHONE ENCOUNTER
Spoke with patient (all identifiers verified) to advise of upcoming ENT appointment with KALA Boss on 12/11/19 at 2:15PM. He was asked to bring photo ID, insurance card, medication list and co-pay if applicable. Patient verbalized understanding. He was given the address and phone number to specialist office [facility] [address] [place] [address]; [phone]). Office notes have been faxed to Mr. Dov Elias.

## 2019-12-09 NOTE — PATIENT INSTRUCTIONS

## 2019-12-09 NOTE — PROGRESS NOTES
1. Have you been to the ER, urgent care clinic since your last visit? Hospitalized since your last visit? No    2. Have you seen or consulted any other health care providers outside of the [address] [place] [address] since your last visit? Include any pap smears or colon screening.  No    Chief Complaint   Patient presents with    Other     left otitis media

## 2019-12-09 NOTE — PROGRESS NOTES
HISTORY OF PRESENT ILLNESS  Celeste Yanez is a 40 y.o. male. HPI: here for follow up on otitis medial over left side. Went to urgent care and was given amoxicillin. Did not make any change in symptoms in 48 hours and last visit with me changed it to augmentin. For now on clinical exam redness over ear canal has been improved. Good light reflex but his fullness sensations has not been improved. Also feeling trouble hearing. No cold or cough at this time. No headaches. 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 No headache or dizziness. No ext weakness. No sore throat. No cold or cough. Sitting comfortable. Denies any side effects from antibiotic. No nausea or vomiting. No abdominal pain. No urinary or bowel complains. No fever. Visit Vitals  /86 (BP 1 Location: Left arm, BP Patient Position: Sitting)   Pulse 63   Temp 98 °F (36.7 °C) (Oral)   Resp 16   Ht 5' 11\" (1.803 m)   Wt 240 lb (108.9 kg)   SpO2 95%   BMI 33.47 kg/m²     Review medication list, vitals, problem list,allergies. ROS: see HPI     Physical Exam  HENT:      Head:      Comments: Ears: Left: mild redness which has been improved compare to last clinical exam over ear canal. Light reflex wnl. TM intact. No discharge. No tenderness     Rt: normal exam. TM intact, normal light reflex. No erythema. No palpable pre or post auricular lymph nodes   Cardiovascular:      Rate and Rhythm: Normal rate and regular rhythm. Neurological:      Mental Status: He is alert and oriented to person, place, and time. Comments: No gait abnormality          ASSESSMENT and PLAN    ICD-10-CM ICD-9-CM    1. Otitis media of left ear follow-up, not resolved: on augmentin. Also sending to ENT to evaluate further as having ear fullness and hearing trouble.   H66.92 382.9 REFERRAL TO ENT-OTOLARYNGOLOGY   2. Hearing problem of left ear H91.92 V41.2 REFERRAL TO ENT-OTOLARYNGOLOGY   Pt understood and agree with the plan

## 2020-10-19 ENCOUNTER — TELEPHONE (OUTPATIENT)
Dept: FAMILY MEDICINE CLINIC | Age: 45
End: 2020-10-19

## 2020-10-19 NOTE — TELEPHONE ENCOUNTER
Called pt to discuss it further. Not able to leave message as said mail box was full and not accepting any more messages. If pt calls back I can speak. I would prefer to see pulmonary and mean time instead of codeine syrup, albuterol as needed. Patient's wife has returned the call on 10/20/2020 and call was transferred from the  to me. Patient has a cough with phlegm on and off during daytime more than night. No fever. Has some short of breath but oxygen saturation at home around 97. No wheezing. Able to talk in full sentence. He has been using Tessalon Perles and albuterol round-the-clock. Also on azithromycin. Reviewed the ER  record. X-ray shows infiltrate. For now advised to continue current plan. If needed we will add the prednisone. For now advised to have a follow-up appointment on Friday this week will be 23rd October as a virtual appointment.   Wife was advised to call back the office and make an appointment

## 2020-10-19 NOTE — TELEPHONE ENCOUNTER
Patient's spouse called. They both tested positive for Covid-19. Patient tested positive on 10/08 and he is still symptomatic including fever. He went to ED yesterday due to shortness of breath and coughing. They gave him and antibiotic and tessalon pearls due to stating he had pneumonia as well. They told him for stronger cough medicine (with codeine) we needed to call his primary care doctor. No fever this morning, but he is still coughing a lot. Wants to know if Dr. Wally Castle would be able to prescribe the cough medicine with the codeine.

## 2024-01-16 ENCOUNTER — OFFICE VISIT (OUTPATIENT)
Age: 49
End: 2024-01-16
Payer: OTHER GOVERNMENT

## 2024-01-16 VITALS
RESPIRATION RATE: 16 BRPM | HEIGHT: 71 IN | SYSTOLIC BLOOD PRESSURE: 116 MMHG | BODY MASS INDEX: 33.32 KG/M2 | WEIGHT: 238 LBS | OXYGEN SATURATION: 97 % | TEMPERATURE: 96.9 F | HEART RATE: 64 BPM | DIASTOLIC BLOOD PRESSURE: 86 MMHG

## 2024-01-16 DIAGNOSIS — Z13.220 SCREENING FOR HYPERLIPIDEMIA: ICD-10-CM

## 2024-01-16 DIAGNOSIS — Z13.1 SCREENING FOR DIABETES MELLITUS: ICD-10-CM

## 2024-01-16 DIAGNOSIS — Z00.00 ENCOUNTER FOR WELL ADULT EXAM WITHOUT ABNORMAL FINDINGS: Primary | ICD-10-CM

## 2024-01-16 DIAGNOSIS — Z11.4 SCREENING FOR HIV (HUMAN IMMUNODEFICIENCY VIRUS): ICD-10-CM

## 2024-01-16 DIAGNOSIS — Z11.59 NEED FOR HEPATITIS C SCREENING TEST: ICD-10-CM

## 2024-01-16 DIAGNOSIS — Z12.11 SCREENING FOR COLON CANCER: ICD-10-CM

## 2024-01-16 DIAGNOSIS — R10.84 GENERALIZED ABDOMINAL PAIN: ICD-10-CM

## 2024-01-16 DIAGNOSIS — K30 INDIGESTION: ICD-10-CM

## 2024-01-16 PROCEDURE — 99396 PREV VISIT EST AGE 40-64: CPT | Performed by: FAMILY MEDICINE

## 2024-01-16 RX ORDER — FAMOTIDINE 20 MG/1
20 TABLET, FILM COATED ORAL 2 TIMES DAILY
Qty: 60 TABLET | Refills: 1 | Status: SHIPPED | OUTPATIENT
Start: 2024-01-16

## 2024-01-16 SDOH — ECONOMIC STABILITY: HOUSING INSECURITY
IN THE LAST 12 MONTHS, WAS THERE A TIME WHEN YOU DID NOT HAVE A STEADY PLACE TO SLEEP OR SLEPT IN A SHELTER (INCLUDING NOW)?: NO

## 2024-01-16 SDOH — ECONOMIC STABILITY: FOOD INSECURITY: WITHIN THE PAST 12 MONTHS, YOU WORRIED THAT YOUR FOOD WOULD RUN OUT BEFORE YOU GOT MONEY TO BUY MORE.: NEVER TRUE

## 2024-01-16 SDOH — ECONOMIC STABILITY: INCOME INSECURITY: HOW HARD IS IT FOR YOU TO PAY FOR THE VERY BASICS LIKE FOOD, HOUSING, MEDICAL CARE, AND HEATING?: NOT HARD AT ALL

## 2024-01-16 SDOH — ECONOMIC STABILITY: FOOD INSECURITY: WITHIN THE PAST 12 MONTHS, THE FOOD YOU BOUGHT JUST DIDN'T LAST AND YOU DIDN'T HAVE MONEY TO GET MORE.: NEVER TRUE

## 2024-01-16 ASSESSMENT — PATIENT HEALTH QUESTIONNAIRE - PHQ9
2. FEELING DOWN, DEPRESSED OR HOPELESS: 0
1. LITTLE INTEREST OR PLEASURE IN DOING THINGS: 0
SUM OF ALL RESPONSES TO PHQ QUESTIONS 1-9: 0
SUM OF ALL RESPONSES TO PHQ QUESTIONS 1-9: 0
SUM OF ALL RESPONSES TO PHQ9 QUESTIONS 1 & 2: 0
SUM OF ALL RESPONSES TO PHQ QUESTIONS 1-9: 0
SUM OF ALL RESPONSES TO PHQ QUESTIONS 1-9: 0

## 2024-01-16 NOTE — PROGRESS NOTES
1. \"Have you been to the ER, urgent care clinic since your last visit?  Hospitalized since your last visit?\" Yes When: [facility] 10/18/2020 for COVID-19.    2. \"Have you seen or consulted any other health care providers outside of the [facility] since your last visit?\" No     Colonoscopy - Never Done    Chief Complaint   Patient presents with    Annual Exam     Patient had abdominal and back pain, but since changing his diet to fish and vegetables these symptoms have resolved         Patient declined flu vaccine.  
1.803 m (5' 11\")   Wt 108 kg (238 lb)   SpO2 97%   BMI 33.19 kg/m²   Wt Readings from Last 3 Encounters:   01/16/24 108 kg (238 lb)   05/20/19 103.9 kg (229 lb)       Waist Circumference  There were no vitals filed for this visit.    Physical Exam  Cardiovascular:      Rate and Rhythm: Normal rate.   Pulmonary:      Effort: Pulmonary effort is normal. No respiratory distress.      Breath sounds: No wheezing.   Abdominal:      Palpations: Abdomen is soft.      Tenderness: There is no abdominal tenderness.   Genitourinary:     Penis: Normal.       Testes: Normal.      Comments:  exam done in presence of nurse LG    Musculoskeletal:         General: No swelling.      Cervical back: Neck supple.   Neurological:      Mental Status: He is alert and oriented to person, place, and time.   Psychiatric:         Behavior: Behavior normal.         Assessment   Plan   1. Encounter for well adult exam without abnormal findings  2. Generalized abdominal pain: On and off.  Indigestion and discomfort.  No point tenderness at this time.  Giving Pepcid and a probiotic over-the-counter.  Sending also to GI for screening colonoscopy and indigestion.  If no improvement in indigestion will consider further workup  -     famotidine (PEPCID) 20 MG tablet; Take 1 tablet by mouth 2 times daily, Disp-60 tablet, R-1Normal  3. Indigestion  -     External Referral To Gastroenterology  -     famotidine (PEPCID) 20 MG tablet; Take 1 tablet by mouth 2 times daily, Disp-60 tablet, R-1Normal  4. Screening for colon cancer  -     External Referral To Gastroenterology   5. Screening for hiv  6. Screening for hepatitis c screening test  7. Screening for diabetes mellitus  8. Screening for hyperlipidemia    Patient understood and agreed with the plan    Personalized Preventive Plan   Current Health Maintenance Status  Immunization History   Administered Date(s) Administered    TDaP, ADACEL (age 10y-64y), BOOSTRIX (age 10y+), IM, 0.5mL 08/13/2017